# Patient Record
Sex: FEMALE | Race: BLACK OR AFRICAN AMERICAN | NOT HISPANIC OR LATINO | URBAN - METROPOLITAN AREA
[De-identification: names, ages, dates, MRNs, and addresses within clinical notes are randomized per-mention and may not be internally consistent; named-entity substitution may affect disease eponyms.]

---

## 2017-03-01 ENCOUNTER — OFFICE (OUTPATIENT)
Dept: URBAN - METROPOLITAN AREA CLINIC 33 | Facility: CLINIC | Age: 57
End: 2017-03-01
Payer: OTHER GOVERNMENT

## 2017-03-01 VITALS
HEIGHT: 60 IN | DIASTOLIC BLOOD PRESSURE: 89 MMHG | HEART RATE: 76 BPM | WEIGHT: 177 LBS | TEMPERATURE: 97.5 F | SYSTOLIC BLOOD PRESSURE: 131 MMHG

## 2017-03-01 DIAGNOSIS — R63.4 ABNORMAL WEIGHT LOSS: ICD-10-CM

## 2017-03-01 DIAGNOSIS — K21.9 GASTRO-ESOPHAGEAL REFLUX DISEASE WITHOUT ESOPHAGITIS: ICD-10-CM

## 2017-03-01 DIAGNOSIS — R11.2 NAUSEA WITH VOMITING, UNSPECIFIED: ICD-10-CM

## 2017-03-01 DIAGNOSIS — R19.4 CHANGE IN BOWEL HABIT: ICD-10-CM

## 2017-03-01 PROCEDURE — 99214 OFFICE O/P EST MOD 30 MIN: CPT

## 2017-03-01 NOTE — SERVICEHPINOTES
ARABELLA MARTINEZ   is a   56   year old    female who is being seen in consultation at the request of   EVAN HAINES   for change in bowel pattern, heartburn and nausea. The last couple of months notes change in bowel pattern. She first started with n/v prior to bowel changes. Trazodone seemed to contributes to n/v and causes "gagging." No recent changes or additions to her medications.  No recent antibiotic exposure, no recent sick contacts, no foreign travel.  She has a BM usually about twice daily. Stools are Ellsworth stool scale type 6-7. No blood or mucous present. The last couple of months she has mainly been eating soups and jello, she is just trying to keep foods down. She has lost about #12. She reports GERD x3 weeks. She takes zantac in the morning with relief of GERD for a period of time. Smells and certain tastes cause n/v. Reports dysphagia with solids, she really has to chew up the chicken noodle soup well otherwise it will come back up. She is under a lot of stress at present, she is currently going to therapy.  She had a colonoscopy in 2010-random biopsy negative for microscopic colitis or IBD.  She reports having a colonoscopy with a physician at [facility] prior to 2010 with findings of polyps, he no longer works there-therefore unable to obtain records.  EGD 2010-negative celiac sprue and negative mary ann test.She is s/p 2 pulmonary emboli in 2015, currently managed with Xarelto. She hasn't stopped the medication before for prior procedures. She saw Dr. Lal hematologist and reports having a negative work up with him and no longer follows with him.

## 2017-04-18 ENCOUNTER — ON CAMPUS - OUTPATIENT (OUTPATIENT)
Dept: URBAN - METROPOLITAN AREA HOSPITAL 35 | Facility: HOSPITAL | Age: 57
End: 2017-04-18
Payer: OTHER GOVERNMENT

## 2017-04-18 DIAGNOSIS — R19.7 DIARRHEA, UNSPECIFIED: ICD-10-CM

## 2017-04-18 DIAGNOSIS — K21.0 GASTRO-ESOPHAGEAL REFLUX DISEASE WITH ESOPHAGITIS: ICD-10-CM

## 2017-04-18 DIAGNOSIS — K29.60 OTHER GASTRITIS WITHOUT BLEEDING: ICD-10-CM

## 2017-04-18 DIAGNOSIS — K29.80 DUODENITIS WITHOUT BLEEDING: ICD-10-CM

## 2017-04-18 PROCEDURE — 43239 EGD BIOPSY SINGLE/MULTIPLE: CPT

## 2017-04-18 PROCEDURE — 45380 COLONOSCOPY AND BIOPSY: CPT

## 2017-06-07 ENCOUNTER — OFFICE (OUTPATIENT)
Dept: URBAN - METROPOLITAN AREA CLINIC 33 | Facility: CLINIC | Age: 57
End: 2017-06-07
Payer: OTHER GOVERNMENT

## 2017-06-07 VITALS
WEIGHT: 173 LBS | HEIGHT: 60 IN | TEMPERATURE: 97.3 F | HEART RATE: 60 BPM | SYSTOLIC BLOOD PRESSURE: 137 MMHG | DIASTOLIC BLOOD PRESSURE: 91 MMHG

## 2017-06-07 DIAGNOSIS — R10.13 EPIGASTRIC PAIN: ICD-10-CM

## 2017-06-07 DIAGNOSIS — K21.9 GASTRO-ESOPHAGEAL REFLUX DISEASE WITHOUT ESOPHAGITIS: ICD-10-CM

## 2017-06-07 DIAGNOSIS — R11.2 NAUSEA WITH VOMITING, UNSPECIFIED: ICD-10-CM

## 2017-06-07 DIAGNOSIS — K59.1 FUNCTIONAL DIARRHEA: ICD-10-CM

## 2017-06-07 DIAGNOSIS — R63.4 ABNORMAL WEIGHT LOSS: ICD-10-CM

## 2017-06-07 PROCEDURE — 99214 OFFICE O/P EST MOD 30 MIN: CPT

## 2017-06-07 NOTE — SERVICEHPINOTES
ARABELLA MARTINEZ   is a   56   year old    female who is being seen in consultation at the request of   RINKU STEVENSON   for n/v/d follow up. She had a colonoscopy 4/18/2017-negative for colitis.  EGD 4/18/2017-peptic duodenitis, negative celiac sprue, reflux esophagitis, negative EoE, no Luu's esophagus, mild chronic inactive gastritis, negative H. pylori. 5/8/17 Ova and parasites negative x3, negative lactoferrin, negative stool culture, and negative c-diff. She has been taking pantoprazole 40mg about 3-4 times a week. She no longer experiences GERD. She notes epigastric discomfort some evenings. She takes Zantac 300mg qhs with relief of pain. This week she has had episodes of forceful n/v can last up to 4 hours. There are some days she will throw up water. Overall n/v frequency has lessened since her procedures. She has lost #4 since March. She mainly eats jello, fruits and cooked vegetables. She eats hard boiled eggs on occasion. On rare occasion eats small bites of baked chicken. She has problems with hamburger because the texture of it. She can't tolerate certain food textures. If she takes a couple of bites of food she feels really full. She reports having DM which she controls with diet and exercise. Seroquel made her nauseous when she increased the dose, therefore it was decreased. She follows with Rinku Stevenson psychiatric NP every 2 weeks. She has a BM most days. Stools are mostly formed. Occasionally has watery or loose stools. No blood in stool.

## 2020-06-17 ENCOUNTER — OFFICE (OUTPATIENT)
Dept: URBAN - METROPOLITAN AREA TELEHEALTH 12 | Facility: TELEHEALTH | Age: 60
End: 2020-06-17
Payer: OTHER GOVERNMENT

## 2020-06-17 VITALS — HEIGHT: 60 IN | WEIGHT: 168 LBS

## 2020-06-17 DIAGNOSIS — I26.99 OTHER PULMONARY EMBOLISM WITHOUT ACUTE COR PULMONALE: ICD-10-CM

## 2020-06-17 DIAGNOSIS — R76.8 OTHER SPECIFIED ABNORMAL IMMUNOLOGICAL FINDINGS IN SERUM: ICD-10-CM

## 2020-06-17 DIAGNOSIS — R19.7 DIARRHEA, UNSPECIFIED: ICD-10-CM

## 2020-06-17 DIAGNOSIS — Z79.01 LONG TERM (CURRENT) USE OF ANTICOAGULANTS: ICD-10-CM

## 2020-06-17 PROCEDURE — 99204 OFFICE O/P NEW MOD 45 MIN: CPT | Mod: 95

## 2020-06-17 NOTE — SERVICEHPINOTES
PATIENT VERIFIED BY DATE OF BIRTH AND NAME. Patient has been consented for this telecommunication visit. BRReports diarrhea intermittently for a couple years now but consistent since March. BMs 3-4x/day but can go up to 7-10x/day. She has had nocturnal diarrhea along with accidents while sleeping. Consistency usually liquid but has been loose, never solid. She has noticed leafy greens tends to make symptoms worse. No rectal bleeding or blood in stool. She reports occasional cramping and gas related to diarrhea although diarrhea is not usually associated with pain will have epigastric pain postprandially at times. She saw PCP who ordered labs and stool studies on 6/1. Stool studies were negative (c diff toxin, o&ampp x1, fecal WBCs, stool culture) positive celiac panel (weakly positive tTG-IgG of 8 (0-5), positive DGP-IgA (35). She started avoiding gluten for 1 week now but would like to go back to consuming this. Colonoscopy in 2010 was unremarkable with normal random biopsies. Her last EGD was in 2017 with no signs of celiac disease on duodenal biopsies. No known family hx of colon cancer, IBD, or celiac disease. Has not tried Imodium recently. Denies weight loss, rash, fever.She does have issues with GERD takes protonix and has been more consistent with it recently usually needs to take daily Tums as well. She takes Zofran prn for nausea which helps has been trying to just eat less so she does not need to take zofran. Occasional vomiting but usually zofran prevents this. She does have diabetes GI emptying scan in 2017 was normal. No dysphagia. She takes xarelto for h/o multiple PEs which is managed by her PCP. Most recently had PE when she ran out of xarelto after her hip replacement in Sept. Has seen hematology with neg workup in the past. She has previously required bridge therapy to stop anticoagulation. Denies known cardiac issues.BR

## 2020-06-17 NOTE — SERVICENOTES
I have reviewed the history, physical exam, assessment and management plans.  I concur with or have edited all elements of her note.

Patient's visit was conducted through video telecommunication. Patient consented before the start of visit as to understanding of privacy concerns, possible technological failure, and their responsibility of carrying out instructions of plan.

## 2020-06-22 ENCOUNTER — OFFICE (OUTPATIENT)
Dept: URBAN - METROPOLITAN AREA CLINIC 34 | Facility: CLINIC | Age: 60
End: 2020-06-22

## 2020-06-22 PROCEDURE — 00031: CPT | Performed by: INTERNAL MEDICINE

## 2020-07-07 ENCOUNTER — ON CAMPUS - OUTPATIENT (OUTPATIENT)
Dept: URBAN - METROPOLITAN AREA HOSPITAL 16 | Facility: HOSPITAL | Age: 60
End: 2020-07-07
Payer: OTHER GOVERNMENT

## 2020-07-07 DIAGNOSIS — R19.7 DIARRHEA, UNSPECIFIED: ICD-10-CM

## 2020-07-07 DIAGNOSIS — K20.8 OTHER ESOPHAGITIS: ICD-10-CM

## 2020-07-07 DIAGNOSIS — K29.60 OTHER GASTRITIS WITHOUT BLEEDING: ICD-10-CM

## 2020-07-07 DIAGNOSIS — R11.2 NAUSEA WITH VOMITING, UNSPECIFIED: ICD-10-CM

## 2020-07-07 PROCEDURE — 43239 EGD BIOPSY SINGLE/MULTIPLE: CPT | Performed by: INTERNAL MEDICINE

## 2020-07-07 PROCEDURE — 45380 COLONOSCOPY AND BIOPSY: CPT | Performed by: INTERNAL MEDICINE

## 2021-02-28 ENCOUNTER — OFFICE (OUTPATIENT)
Dept: URBAN - METROPOLITAN AREA CLINIC 102 | Facility: CLINIC | Age: 61
End: 2021-02-28
Payer: OTHER GOVERNMENT

## 2021-02-28 DIAGNOSIS — K21.9 GASTRO-ESOPHAGEAL REFLUX DISEASE WITHOUT ESOPHAGITIS: ICD-10-CM

## 2021-02-28 DIAGNOSIS — E11.9 TYPE 2 DIABETES MELLITUS WITHOUT COMPLICATIONS: ICD-10-CM

## 2021-02-28 DIAGNOSIS — I10 ESSENTIAL (PRIMARY) HYPERTENSION: ICD-10-CM

## 2021-02-28 PROCEDURE — 99487 CPLX CHRNC CARE 1ST 60 MIN: CPT | Performed by: INTERNAL MEDICINE

## 2021-02-28 PROCEDURE — 99489 CPLX CHRNC CARE EA ADDL 30: CPT | Performed by: INTERNAL MEDICINE

## 2021-04-30 ENCOUNTER — OFFICE (OUTPATIENT)
Dept: URBAN - METROPOLITAN AREA CLINIC 102 | Facility: CLINIC | Age: 61
End: 2021-04-30

## 2021-04-30 DIAGNOSIS — I10 ESSENTIAL (PRIMARY) HYPERTENSION: ICD-10-CM

## 2021-04-30 DIAGNOSIS — E11.9 TYPE 2 DIABETES MELLITUS WITHOUT COMPLICATIONS: ICD-10-CM

## 2021-04-30 DIAGNOSIS — K21.9 GASTRO-ESOPHAGEAL REFLUX DISEASE WITHOUT ESOPHAGITIS: ICD-10-CM

## 2021-04-30 PROCEDURE — 99490 CHRNC CARE MGMT STAFF 1ST 20: CPT | Performed by: INTERNAL MEDICINE

## 2021-05-31 ENCOUNTER — OFFICE (OUTPATIENT)
Dept: URBAN - METROPOLITAN AREA CLINIC 102 | Facility: CLINIC | Age: 61
End: 2021-05-31

## 2021-05-31 DIAGNOSIS — I10 ESSENTIAL (PRIMARY) HYPERTENSION: ICD-10-CM

## 2021-05-31 DIAGNOSIS — K21.9 GASTRO-ESOPHAGEAL REFLUX DISEASE WITHOUT ESOPHAGITIS: ICD-10-CM

## 2021-05-31 DIAGNOSIS — E11.9 TYPE 2 DIABETES MELLITUS WITHOUT COMPLICATIONS: ICD-10-CM

## 2021-05-31 PROCEDURE — 99490 CHRNC CARE MGMT STAFF 1ST 20: CPT | Performed by: INTERNAL MEDICINE
